# Patient Record
Sex: FEMALE | Race: WHITE | NOT HISPANIC OR LATINO | Employment: STUDENT | URBAN - METROPOLITAN AREA
[De-identification: names, ages, dates, MRNs, and addresses within clinical notes are randomized per-mention and may not be internally consistent; named-entity substitution may affect disease eponyms.]

---
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
## 2024-09-13 ENCOUNTER — HOSPITAL ENCOUNTER (EMERGENCY)
Facility: HOSPITAL | Age: 24
Discharge: HOME | End: 2024-09-13
Attending: EMERGENCY MEDICINE

## 2024-09-13 VITALS
HEART RATE: 88 BPM | WEIGHT: 220 LBS | TEMPERATURE: 98 F | OXYGEN SATURATION: 99 % | RESPIRATION RATE: 19 BRPM | DIASTOLIC BLOOD PRESSURE: 76 MMHG | BODY MASS INDEX: 36.61 KG/M2 | SYSTOLIC BLOOD PRESSURE: 119 MMHG

## 2024-09-13 DIAGNOSIS — R10.13 EPIGASTRIC PAIN: ICD-10-CM

## 2024-09-13 DIAGNOSIS — K80.50 BILIARY COLIC: ICD-10-CM

## 2024-09-13 DIAGNOSIS — R07.9 CHEST PAIN: Primary | ICD-10-CM

## 2024-09-13 LAB
ALBUMIN SERPL BCP-MCNC: 3.6 G/DL (ref 3.5–5.2)
ALP SERPL-CCNC: 93 U/L (ref 55–135)
ALT SERPL W/O P-5'-P-CCNC: 15 U/L (ref 10–44)
ANION GAP SERPL CALC-SCNC: 8 MMOL/L (ref 8–16)
AST SERPL-CCNC: 12 U/L (ref 10–40)
B-HCG UR QL: NEGATIVE
BASOPHILS # BLD AUTO: 0.05 K/UL (ref 0–0.2)
BASOPHILS NFR BLD: 0.6 % (ref 0–1.9)
BILIRUB SERPL-MCNC: 0.2 MG/DL (ref 0.1–1)
BUN SERPL-MCNC: 10 MG/DL (ref 6–20)
CALCIUM SERPL-MCNC: 9.3 MG/DL (ref 8.7–10.5)
CHLORIDE SERPL-SCNC: 106 MMOL/L (ref 95–110)
CO2 SERPL-SCNC: 24 MMOL/L (ref 23–29)
CREAT SERPL-MCNC: 0.8 MG/DL (ref 0.5–1.4)
CTP QC/QA: YES
D DIMER PPP IA.FEU-MCNC: 0.3 MG/L FEU
DIFFERENTIAL METHOD BLD: ABNORMAL
EOSINOPHIL # BLD AUTO: 0.1 K/UL (ref 0–0.5)
EOSINOPHIL NFR BLD: 0.6 % (ref 0–8)
ERYTHROCYTE [DISTWIDTH] IN BLOOD BY AUTOMATED COUNT: 13.2 % (ref 11.5–14.5)
EST. GFR  (NO RACE VARIABLE): >60 ML/MIN/1.73 M^2
GLUCOSE SERPL-MCNC: 124 MG/DL (ref 70–110)
HCT VFR BLD AUTO: 44 % (ref 37–48.5)
HGB BLD-MCNC: 13.8 G/DL (ref 12–16)
IMM GRANULOCYTES # BLD AUTO: 0.01 K/UL (ref 0–0.04)
IMM GRANULOCYTES NFR BLD AUTO: 0.1 % (ref 0–0.5)
LIPASE SERPL-CCNC: 22 U/L (ref 4–60)
LYMPHOCYTES # BLD AUTO: 1 K/UL (ref 1–4.8)
LYMPHOCYTES NFR BLD: 12.1 % (ref 18–48)
MCH RBC QN AUTO: 26.1 PG (ref 27–31)
MCHC RBC AUTO-ENTMCNC: 31.4 G/DL (ref 32–36)
MCV RBC AUTO: 83 FL (ref 82–98)
MONOCYTES # BLD AUTO: 0.4 K/UL (ref 0.3–1)
MONOCYTES NFR BLD: 4.3 % (ref 4–15)
NEUTROPHILS # BLD AUTO: 6.7 K/UL (ref 1.8–7.7)
NEUTROPHILS NFR BLD: 82.3 % (ref 38–73)
NRBC BLD-RTO: 0 /100 WBC
OHS QRS DURATION: 82 MS
OHS QTC CALCULATION: 440 MS
PLATELET # BLD AUTO: 302 K/UL (ref 150–450)
PMV BLD AUTO: 10.6 FL (ref 9.2–12.9)
POC MOLECULAR INFLUENZA A AGN: NEGATIVE
POC MOLECULAR INFLUENZA B AGN: NEGATIVE
POTASSIUM SERPL-SCNC: 4.2 MMOL/L (ref 3.5–5.1)
PROT SERPL-MCNC: 7.4 G/DL (ref 6–8.4)
RBC # BLD AUTO: 5.29 M/UL (ref 4–5.4)
SARS-COV-2 RDRP RESP QL NAA+PROBE: NEGATIVE
SODIUM SERPL-SCNC: 138 MMOL/L (ref 136–145)
TROPONIN I SERPL DL<=0.01 NG/ML-MCNC: <0.006 NG/ML (ref 0–0.03)
WBC # BLD AUTO: 8.18 K/UL (ref 3.9–12.7)

## 2024-09-13 PROCEDURE — 87635 SARS-COV-2 COVID-19 AMP PRB: CPT | Performed by: EMERGENCY MEDICINE

## 2024-09-13 PROCEDURE — 85379 FIBRIN DEGRADATION QUANT: CPT | Performed by: EMERGENCY MEDICINE

## 2024-09-13 PROCEDURE — 93005 ELECTROCARDIOGRAM TRACING: CPT

## 2024-09-13 PROCEDURE — 83690 ASSAY OF LIPASE: CPT | Performed by: EMERGENCY MEDICINE

## 2024-09-13 PROCEDURE — 87502 INFLUENZA DNA AMP PROBE: CPT

## 2024-09-13 PROCEDURE — 84484 ASSAY OF TROPONIN QUANT: CPT | Performed by: EMERGENCY MEDICINE

## 2024-09-13 PROCEDURE — 85025 COMPLETE CBC W/AUTO DIFF WBC: CPT | Performed by: EMERGENCY MEDICINE

## 2024-09-13 PROCEDURE — 93010 ELECTROCARDIOGRAM REPORT: CPT | Mod: ,,, | Performed by: INTERNAL MEDICINE

## 2024-09-13 PROCEDURE — 99285 EMERGENCY DEPT VISIT HI MDM: CPT | Mod: 25

## 2024-09-13 PROCEDURE — 81025 URINE PREGNANCY TEST: CPT | Performed by: EMERGENCY MEDICINE

## 2024-09-13 PROCEDURE — 80053 COMPREHEN METABOLIC PANEL: CPT | Performed by: EMERGENCY MEDICINE

## 2024-09-13 NOTE — ED PROVIDER NOTES
Encounter Date: 9/13/2024       History     Chief Complaint   Patient presents with    Chest Pain     X 1 hour ago, having tight chest pain, hurts to breathe. Took tums.      24-year-old female presents via significant other to [facility] with chest pain.  Patient states she was in her usual state of health when she went to bed around midnight.  Around 2:00 a.m., she woke up with epigastric pain, worse on inspiration.  Patient took Tums without relief.  Patient attempted to vomit and to have a bowel movement without relief.  Patient notes her pain did improve a little, and she went back to sleep, but then it worsened again.    No regular meds.  Not on any hormones.    Patient is concerned because some family members developed heart disease at a young age.          Review of patient's allergies indicates:   Allergen Reactions    Rocephin [ceftriaxone]      Past Medical History:   Diagnosis Date    ADHD (attention deficit hyperactivity disorder)     Allergy     Anxiety     Borderline personality disorder     Depression     Migraine      History reviewed. No pertinent surgical history.  Family History   Problem Relation Name Age of Onset    Heart attack Mother      Diabetes Mother      Hypertension Mother      Hypertension Father      Hyperlipidemia Father      Diabetes type II Father       Social History     Tobacco Use    Smoking status: Never   Substance Use Topics    Alcohol use: No    Drug use: No     Review of Systems   Constitutional:  Negative for fever.   HENT:  Negative for rhinorrhea.    Eyes:  Negative for photophobia.   Respiratory:  Positive for shortness of breath.    Cardiovascular:  Positive for chest pain.   Gastrointestinal:  Positive for abdominal pain.   Genitourinary:  Negative for dysuria.   Musculoskeletal:  Negative for gait problem.   Skin:  Negative for rash.   Neurological:  Negative for syncope.       Physical Exam     Initial Vitals [09/13/24 0508]   BP Pulse Resp Temp SpO2   (!)  141/82 73 18 98.1 °F (36.7 °C) 100 %      MAP       --         Physical Exam    Nursing note and vitals reviewed.  Constitutional: She appears well-developed and well-nourished. She is not diaphoretic.   Awake, alert, nontoxic.   HENT:   Head: Normocephalic and atraumatic.   Mouth/Throat: Oropharynx is clear and moist.   Eyes: Conjunctivae and EOM are normal. Pupils are equal, round, and reactive to light.   Neck: Neck supple.   Normal range of motion.  Cardiovascular:  Normal rate, regular rhythm and intact distal pulses.           Pulmonary/Chest: Breath sounds normal. No respiratory distress. She has no wheezes. She has no rhonchi. She has no rales.   Abdominal: Abdomen is soft. There is abdominal tenderness (epigastric, RUQ). There is no rebound and no guarding.   Musculoskeletal:         General: No tenderness or edema. Normal range of motion.      Cervical back: Normal range of motion and neck supple.     Neurological: She is alert and oriented to person, place, and time.   Moving all extremities.   Skin: Skin is warm and dry. No erythema. No pallor.   Psychiatric: She has a normal mood and affect.         ED Course   Procedures  Labs Reviewed   COMPREHENSIVE METABOLIC PANEL - Abnormal       Result Value    Sodium 138      Potassium 4.2      Chloride 106      CO2 24      Glucose 124 (*)     BUN 10      Creatinine 0.8      Calcium 9.3      Total Protein 7.4      Albumin 3.6      Total Bilirubin 0.2      Alkaline Phosphatase 93      AST 12      ALT 15      eGFR >60      Anion Gap 8     CBC W/ AUTO DIFFERENTIAL - Abnormal    WBC 8.18      RBC 5.29      Hemoglobin 13.8      Hematocrit 44.0      MCV 83      MCH 26.1 (*)     MCHC 31.4 (*)     RDW 13.2      Platelets 302      MPV 10.6      Immature Granulocytes 0.1      Gran # (ANC) 6.7      Immature Grans (Abs) 0.01      Lymph # 1.0      Mono # 0.4      Eos # 0.1      Baso # 0.05      nRBC 0      Gran % 82.3 (*)     Lymph % 12.1 (*)     Mono % 4.3      Eosinophil %  0.6      Basophil % 0.6      Differential Method Automated     POCT URINE PREGNANCY - Normal    POC Preg Test, Ur Negative       Acceptable Yes     LIPASE    Lipase 22     TROPONIN I    Troponin I <0.006     D DIMER, QUANTITATIVE    D-Dimer 0.30     SARS-COV-2 RDRP GENE   POCT INFLUENZA A/B MOLECULAR     EKG Readings: (Independently Interpreted)   05:06: NSR, HR 94. Normal axis. TWI in III. No ectopy. No STEMI.        Imaging Results              US Abdomen Limited (In process)                      X-Ray Chest PA And Lateral (In process)                      Medications - No data to display  Medical Decision Making  24 y.o. female with chest / epigastric pain, worse on inspiration, causing patient to feel short of breath.    Ddx includes ACS, dissection, PE, CHF, GERD, PUD, pancreatitis, biliary colic, choledocholithiasis, cholangitis, cholecystitis, other.    UPT negative.    EKG no STEMI.    CXR NAD.    CBC, CMP, lipase, troponin, ddimer reassuring.  Single troponin collected at 5:53am rules out ACS as chest pain has been ongoing since 2:00am.  Normal ddimer is very reassuring for no PE and no dissection.    Patient declined pain meds here in ER.    Ultrasound RUQ has been done and formal read is pending though tech reports gallstones.      I discussed diagnosis of gallstones with patient and significant other.  I have discussed diet modification as well as surgery (cholecystectomy).  As patient is uninsured, she will need to f/u to [facility] if she desires cholecystectomy.  I have provided information for [facility].    Etta Alvarenga      Amount and/or Complexity of Data Reviewed  Labs: ordered.  Radiology: ordered.                                      Clinical Impression:  Final diagnoses:  [R07.9] Chest pain (Primary)  [R10.13] Epigastric pain  [K80.50] Biliary colic          ED Disposition Condition    Discharge Stable          ED Prescriptions    None       Follow-up Information       Follow up With  Specialties Details Why Contact Info    Surgery, [facility] - Trauma/General Trauma Surgery, General Surgery, Surgery Schedule an appointment as soon as possible for a visit   [address] [facility] [address]  [phone]               Etta Alvarenga MD  09/13/24 0707

## 2024-09-13 NOTE — DISCHARGE INSTRUCTIONS
Use acetaminophen and ibuprofen to control your pain.     Over the counter tylenol (acetaminophen) comes in tablets of 325mg and 500mg. You may take 3 of the regular strength (325mg) for a total of 975mg, or 2 of the extra strength (500mg) for a total of 1000mg, every 6 hours. Do not exceed a total of 4000mg a day.    Over the counter motrin (ibuprofen) comes in tablets of 200mg. You may take 3 of these tablets (a total of 600mg) every 6 hours or 4 of these tablets (a total of 800mg) every 8 hours. Do not exceed a total of 2400mg a day.

## 2024-09-13 NOTE — ED TRIAGE NOTES
Phoenix Himel is a 24 y.o female with PMHx of ADHD, anxiety, and depression to ED c/o midsternal chest pain that woke up her sleep 1hr pta.  Pain described as pressure.  No relief of symptoms after taking TUMS.

## 2024-09-13 NOTE — ED NOTES
Radiology contacted regarding US and x-ray results; states that they will contact radiologist for update.